# Patient Record
Sex: FEMALE | Race: WHITE
[De-identification: names, ages, dates, MRNs, and addresses within clinical notes are randomized per-mention and may not be internally consistent; named-entity substitution may affect disease eponyms.]

---

## 2019-07-09 ENCOUNTER — HOSPITAL ENCOUNTER (OUTPATIENT)
Dept: HOSPITAL 47 - WWCWWP | Age: 64
Discharge: HOME | End: 2019-07-09
Payer: MEDICARE

## 2019-07-09 VITALS — HEART RATE: 79 BPM | RESPIRATION RATE: 16 BRPM | TEMPERATURE: 97.7 F

## 2019-07-09 VITALS — BODY MASS INDEX: 26.3 KG/M2

## 2019-07-09 VITALS — DIASTOLIC BLOOD PRESSURE: 94 MMHG | SYSTOLIC BLOOD PRESSURE: 176 MMHG

## 2019-07-09 DIAGNOSIS — Z12.31: Primary | ICD-10-CM

## 2019-07-09 PROCEDURE — 77067 SCR MAMMO BI INCL CAD: CPT

## 2019-07-09 PROCEDURE — 77063 BREAST TOMOSYNTHESIS BI: CPT

## 2019-07-09 NOTE — P.HPOB
History of Present Illness


H&P Date: 19


Chief Complaint: The patient is here for her routine gynecologic exam and ma

mmogram.


This is a 64-year-old  with an LMP of .  The patient is here to 

establish with this office.  It is been about 2 years since her last pelvic 

exam.  The patient is without gynecologic complaints and denies any 

postmenopausal bleeding.








Review of Systems


She has lost about 22 pounds on the keto diet.  She previously weighed about 190

pounds.  She denies respiratory, cardiac and G.I. problems.  She denies 

maltreatment or problems with falling.  : she denies any significant problems 

with urinary leakage.








Past Medical History


Past Medical History: Hypertension, Thyroid Disorder


Additional Past Medical History / Comment(s): Hypothyroid. PAST GYN HISTORY: She

has no history of STDs.  5 vaginal deliveries and last pregnancy was a 

with twins.


History of Any Multi-Drug Resistant Organisms: None Reported


Past Surgical History:  Section, Tubal Ligation


Additional Past Surgical History / Comment(s):  with last pregnancy.  

Unilateral oophorectomy(benign).  Colonoscopy .


Past Psychological History: No Psychological Hx Reported


Smoking Status: Former smoker


Past Alcohol Use History: Daily (2 glasses of wine per day)


Past Drug Use History: Marijuana


Additional Drug Use History / Comment(s): Used marijuana until her early 20s.  

She denies any recent drug use.


Additional History: Quit smoking at age 22.  She has been  since  and

is infrequently sexually active.  She is retired.





- Past Family History


  ** Father


Family Medical History: Myocardial Infarction (MI)





  ** Mother


Family Medical History: Cancer


Additional Family Medical History / Comment(s): Leukemia.  Maternal aunt had 

breast cancer.





  ** Son(s)


Additional Family Medical History / Comment(s): Down syndrome





  ** Sister(s)


Family Medical History: Cancer


Additional Family Medical History / Comment(s): Uncertain of cancer type.





Medications and Allergies


                                Home Medications











 Medication  Instructions  Recorded  Confirmed  Type


 


Black Cohosh 540 mg PO DAILY 19 History


 


Cholecalciferol (Vitamin D3) 2,000 unit PO DAILY 19 History





[Vitamin D3]    


 


Red Yeast Rice 600 mg PO QID 19 History


 


Thyroid,Pork [[place] Thyroid] 60 mg PO DAILY 19 History


 


Vitamin B Complex 1 each PO DAILY 19 History


 


Vitamin E (Dl,Tocopheryl Acet) 400 unit PO DAILY 19 History





[Vitamin E]    








                                    Allergies











Allergy/AdvReac Type Severity Reaction Status Date / Time


 


No Known Allergies Allergy   Unverified 19 13:18














Exam


                                   Vital Signs











  Temp Pulse Resp BP Pulse Ox


 


 19 13:07  97.7 F  79  16  176/94  100








                                Intake and Output











 19





 22:59 06:59 14:59


 


Other:   


 


  Weight   76.204 kg











Repeat blood pressure 152/82.


Height 5'7", weight 168 pounds, BMI 26.3.





This is a well-developed well-nourished white female who is alert and oriented 

times 3 in no acute distress.


HEENT: Within normal limits.


NECK: Supple without mass or thyromegaly.


CHEST AND LUNGS: Clear to auscultation.


HEART: Regular rate and rhythm.


BREASTS: Are without mass or discharge.


AXILLARY EXAM: Negative for adenopathy.


BACK: Negative for CVA tenderness.


ABDOMEN: Soft, nontender, without palpable masses.


PELVIC EXAM: Normal external genitalia with mild atrophy. Cervix and vagina 

appear normal with mild atrophy. There is no unusual discharge.  There is no 

evidence of prolapse.  The uterus is midposition, nongravid size and nontender. 

There are no palpable adnexal masses or tenderness.


RECTAL EXAM: rectovaginal exam is negative for mass or tenderness and is 

negative for occult blood.


EXTREMITIES: Nontender.





IMPRESSION: 


1.  64-year-old menopausal female with normal gynecologic exam.


2.  Elevated blood pressure currently not on blood pressure medication.





PLAN: 


1.  Pap smear was performed.


2.  Self breast awareness was discussed with the patient.


3.  Screening mammogram will be done today.


4.  I've recommended that she take her blood pressure at home on a regular 

basis.  She has ordered a blood pressure cuff for this.  She will follow-up with

Dr. Gutierrez regarding blood pressure elevations.


5.  She states she does not get flu shots in the fall.  I've recommended she 

reconsider this.


6.Osteoporosis prevention was discussed.  I have stressed the importance of 

adequate calcium, vitamin D and regular exercise.  Recommended amounts of 

calcium and vitamin D were also discussed.  I have recommended bone density 

screening and the order slip was given to the patient for this.


7.She was advised to return in one year for her annual well woman exam.

## 2019-07-16 NOTE — MM
Reason for exam: screening  (asymptomatic).

Last mammogram was performed 2 years and 1 month ago.



History:

Patient is postmenopausal.

Family history of breast cancer in maternal aunt.



Physical Findings:

A clinical breast exam by your physician is recommended on an annual basis and 

results should be correlated with mammographic findings.



MG 3D Screening Mammo W/Cad

Bilateral CC and MLO view(s) were taken.

Prior study comparison: June 19, 2017, mammogram, performed at [facility]. 

February 24, 2016, mammogram, performed at [facility].

The breast tissue is almost entirely fat.  No significant changes when compared 

with prior studies.





ASSESSMENT: Benign, BI-RAD 2



RECOMMENDATION:

Routine screening mammogram of both breasts in 1 year.

## 2019-07-22 ENCOUNTER — HOSPITAL ENCOUNTER (OUTPATIENT)
Dept: HOSPITAL 47 - RADBDWWP | Age: 64
Discharge: HOME | End: 2019-07-22
Payer: MEDICARE

## 2019-07-22 DIAGNOSIS — Z78.0: ICD-10-CM

## 2019-07-22 DIAGNOSIS — M85.80: Primary | ICD-10-CM

## 2019-07-22 PROCEDURE — 77080 DXA BONE DENSITY AXIAL: CPT

## 2019-07-23 NOTE — BD
EXAMINATION TYPE: Axial Bone Density

 

DATE OF EXAM: 7/22/2019

 

COMPARISON: NONE

 

CLINICAL HISTORY: 64-year-old female postmenopausal screening without HRT

 

Height:  66.75

 

Weight:  165

 

FRAX RISK QUESTIONS:

Alcohol (3 or more units per day):  no

Family History (Parent hip fracture):  no

Glucocorticoids (More than 3mos):  no

           (Ex: prednisone, prednisolone, methylprednisolone, dexamethasone, and hydrocortisone).    
     

History of Fracture in Adulthood: no

Secondary Osteoporosis: 

  1.  Type 1 Diabetes: no

  2.  Hyperthyroidism: no

  3.  Menopause before 45: no

  4.  Malnutrition: no

  5.  Chronic liver disease: no

Rheumatoid Arthritis: no

Current Tobacco Use: no

 

RISK FACTORS 

HISTORY OF: 

Family History of Osteoporosis: not to patient's knowledge

Active: yes

Diet low in dairy products/other sources of calcium:  no

Postmenopausal woman: yes

Take estrogen and/or progesterone medications: not now

How long: topical for several years

Lost more than 2 inches in height since high school: no

Frequent falls: no

Poor Health: no

Hyperparathyroidism: no

Adrenal Insufficiency: no

 

MEDICATIONS: 

Thyroid Medications: yes

Which medication: Golden

How Long: at least 20 years

Additional Medications: many vitamins , cholesterol med

 

 

Additional History: Hashimoto's

 

 

EXAM MEASUREMENTS: 

Bone mineral densitometry was performed using the [employer].

Bone mineral density as measured about the Lumbar spine is:  

----- L1-L4(G/cm2): 1.011

T Score Values are as follows:

----- L2: -1.5

----- L3: -0.9

----- L4: -1.7

----- L1-L4: -1.4

No previous at this facility

 

Bone mineral density about the R hip (g/cm2): 0.908

Bone mineral density about the L hip (g/cm2): 0.915

T Score values are as follows:

-----R Neck: -0.9

-----L Neck: -0.9

-----R Total: -0.7

-----L Total: -0.6

No  previous at this facility

 

 

 

IMPRESSION:

Osteopenia (T Score between -2.5 and -1).

 

There is slightly increased risk of fracture and the patient may be considered 

for treatment. 

 

Re-Screen 2-5 years.

 

 

 

 

 

NOTE:  T-SCORE=SD OF THE YOUNG ADULT MEAN.

## 2019-11-18 ENCOUNTER — HOSPITAL ENCOUNTER (OUTPATIENT)
Dept: HOSPITAL 47 - ORWHC2ENDO | Age: 64
Discharge: HOME | End: 2019-11-18
Attending: SURGERY
Payer: MEDICARE

## 2019-11-18 VITALS — TEMPERATURE: 98.1 F

## 2019-11-18 VITALS — BODY MASS INDEX: 25.5 KG/M2

## 2019-11-18 VITALS — HEART RATE: 72 BPM

## 2019-11-18 VITALS — DIASTOLIC BLOOD PRESSURE: 72 MMHG | SYSTOLIC BLOOD PRESSURE: 118 MMHG | RESPIRATION RATE: 18 BRPM

## 2019-11-18 DIAGNOSIS — Z80.6: ICD-10-CM

## 2019-11-18 DIAGNOSIS — Z12.11: Primary | ICD-10-CM

## 2019-11-18 DIAGNOSIS — Z90.721: ICD-10-CM

## 2019-11-18 DIAGNOSIS — E03.9: ICD-10-CM

## 2019-11-18 DIAGNOSIS — I10: ICD-10-CM

## 2019-11-18 DIAGNOSIS — Z80.3: ICD-10-CM

## 2019-11-18 DIAGNOSIS — Z87.891: ICD-10-CM

## 2019-11-18 DIAGNOSIS — Z98.51: ICD-10-CM

## 2019-11-18 DIAGNOSIS — Q43.8: ICD-10-CM

## 2019-11-18 DIAGNOSIS — Z86.010: ICD-10-CM

## 2019-11-18 DIAGNOSIS — Z82.49: ICD-10-CM

## 2019-11-18 DIAGNOSIS — Z79.890: ICD-10-CM

## 2019-11-18 DIAGNOSIS — Z82.79: ICD-10-CM

## 2019-11-18 NOTE — P.PCN
Date of Procedure: 11/18/19


Procedure(s) Performed: 


PREOPERATIVE DIAGNOSIS: Colon cancer screening, history of polyps


POSTOPERATIVE DIAGNOSIS: Tortuous colon otherwise normal


PROCEDURE: Colonoscopy 


ANESTHESIA: MAC


SURGEON: Lb Ziegler M.D.


SPECIMENS: None


ENDOSCOPIC PROCEDURE:  The patient was placed on the endoscopy table in the left

decubitus position.  The Olympus colonoscope was inserted into the anus and 

passed under direct visualization to the base of the cecum.  The appendiceal 

orifice was visualized.  From that point the scope was slowly withdrawn 

inspecting all surfaces carefully.  There were no neoplastic inflammatory or 

polypoid lesions throughout the cecum, ascending, transverse, descending, 

sigmoid and rectum.  There was no visible diverticulosis noted.  There was 

fairly significant tortuosity present.  Digital rectal examination was normal.  

The patient was taken to the recovery room in stable condition per anesthesia 

guidelines.


RECOMMENDATIONS: Increase fiber.  Follow-up colonoscopy in 5-10 years based on 

previous polypectomy pathology results.

## 2019-11-18 NOTE — P.GSHP
History of Present Illness


H&P Date: 19


Chief Complaint: Colon cancer screening





64-year-old female with history of colon polyps.  No bowel complaints.  No 

family history of colon cancer.  Last colonoscopy 5 years ago.  History of 

polyps.





Past Medical History


Past Medical History: Hypertension, Thyroid Disorder


Additional Past Medical History / Comment(s): Hypothyroid. Hx hypertension, no 

medication.


History of Any Multi-Drug Resistant Organisms: None Reported


Past Surgical History:  Section, Tubal Ligation


Additional Past Surgical History / Comment(s):  Section X1, unilateral 

oophorectomy, Colonoscopies.


Past Anesthesia/Blood Transfusion Reactions: No Reported Reaction


Past Psychological History: No Psychological Hx Reported


Smoking Status: Former smoker


Past Alcohol Use History: Daily


Additional Past Alcohol Use History / Comment(s): Smoked from age 15 to 22. 

Drinks 2 glasses of wine daily.


Past Drug Use History: None Reported





- Past Family History


  ** Father


Family Medical History: Myocardial Infarction (MI)





  ** Mother


Family Medical History: Cancer


Additional Family Medical History / Comment(s): Leukemia.  Maternal aunt had 

breast cancer.





  ** Son(s)


Additional Family Medical History / Comment(s): Down syndrome.





  ** Sister(s)


Family Medical History: Cancer


Additional Family Medical History / Comment(s): Uncertain of cancer type.





Medications and Allergies


                                Home Medications











 Medication  Instructions  Recorded  Confirmed  Type


 


Cholecalciferol (Vitamin D3) 4,000 unit PO DAILY 19 History





[Vitamin D3]    


 


Thyroid,Pork [[place] Thyroid] 60 mg PO DAILY 19 History


 


Vitamin B Complex 1 each PO DAILY 19 History


 


Ashwaghanda 3 tab PO DAILY 19 History


 


Black Cohosh 80 mg PO DAILY 19 History


 


Curcumin 500 mg PO DAILY 19 History


 


Kelp 500 mg PO DAILY 19 History








                                    Allergies











Allergy/AdvReac Type Severity Reaction Status Date / Time


 


No Known Allergies Allergy   Unverified 19 10:40














Surgical - Exam


                                   Vital Signs











Temp Pulse Resp BP Pulse Ox


 


 98.1 F   63   18   160/75   99 


 


 19 10:36  19 10:36  19 10:36  19 10:36  19 10:36

















Physical exam:


General: Well-developed, well-nourished


HEENT: Normocephalic, sclerae nonicteric


Abdomen: Nontender, nondistended


Extremities: No edema


Neuro: Alert and oriented





Assessment and Plan


(1) Colon cancer screening


Narrative/Plan: 


Will proceed with colonoscopy


Current Visit: Yes   Status: Acute   Code(s): Z12.11 - ENCOUNTER FOR SCREENING 

FOR MALIGNANT NEOPLASM OF COLON   SNOMED Code(s): 602322278